# Patient Record
Sex: MALE | Race: BLACK OR AFRICAN AMERICAN
[De-identification: names, ages, dates, MRNs, and addresses within clinical notes are randomized per-mention and may not be internally consistent; named-entity substitution may affect disease eponyms.]

---

## 2022-05-30 ENCOUNTER — HOSPITAL ENCOUNTER (EMERGENCY)
Dept: HOSPITAL 26 - MED | Age: 32
Discharge: HOME | End: 2022-05-30
Payer: SELF-PAY

## 2022-05-30 VITALS — SYSTOLIC BLOOD PRESSURE: 133 MMHG | DIASTOLIC BLOOD PRESSURE: 70 MMHG

## 2022-05-30 VITALS — HEIGHT: 72 IN | BODY MASS INDEX: 21.26 KG/M2 | WEIGHT: 157 LBS

## 2022-05-30 VITALS — DIASTOLIC BLOOD PRESSURE: 70 MMHG | SYSTOLIC BLOOD PRESSURE: 133 MMHG

## 2022-05-30 DIAGNOSIS — Z79.899: ICD-10-CM

## 2022-05-30 DIAGNOSIS — F12.90: ICD-10-CM

## 2022-05-30 DIAGNOSIS — K64.4: Primary | ICD-10-CM

## 2022-05-30 NOTE — NUR
Patient discharged with v/s stable. Written and verbal after care instructions 
given and explained. 

Patient alert, oriented and verbalized understanding of instructions. 
Ambulatory with steady gait. All questions addressed prior to discharge. ID 
band removed. Patient advised to follow up with PMD. Rx of ANULSOL-HC given. 
Patient educated on indication of medication including possible reaction and 
side effects. Opportunity to ask questions provided and answered. VSS, A/OX4, 
UNLABORED BREATHING, AMBULATORY, AND CALM DEMEANOR. 



NO NURSING INTERVENTIONS NEEDED